# Patient Record
Sex: FEMALE | Race: WHITE | NOT HISPANIC OR LATINO | Employment: UNEMPLOYED | URBAN - METROPOLITAN AREA
[De-identification: names, ages, dates, MRNs, and addresses within clinical notes are randomized per-mention and may not be internally consistent; named-entity substitution may affect disease eponyms.]

---

## 2022-11-18 ENCOUNTER — OFFICE VISIT (OUTPATIENT)
Dept: FAMILY MEDICINE CLINIC | Facility: CLINIC | Age: 57
End: 2022-11-18

## 2022-11-18 ENCOUNTER — HOSPITAL ENCOUNTER (OUTPATIENT)
Dept: GENERAL RADIOLOGY | Facility: HOSPITAL | Age: 57
Discharge: HOME | End: 2022-11-18
Admitting: STUDENT IN AN ORGANIZED HEALTH CARE EDUCATION/TRAINING PROGRAM

## 2022-11-18 ENCOUNTER — PATIENT ROUNDING (BHMG ONLY) (OUTPATIENT)
Dept: FAMILY MEDICINE CLINIC | Facility: CLINIC | Age: 57
End: 2022-11-18

## 2022-11-18 VITALS
OXYGEN SATURATION: 99 % | HEIGHT: 63 IN | WEIGHT: 118.8 LBS | BODY MASS INDEX: 21.05 KG/M2 | SYSTOLIC BLOOD PRESSURE: 110 MMHG | DIASTOLIC BLOOD PRESSURE: 68 MMHG | HEART RATE: 75 BPM

## 2022-11-18 DIAGNOSIS — R22.31 NODULE OF FINGER OF RIGHT HAND: ICD-10-CM

## 2022-11-18 DIAGNOSIS — E03.9 ACQUIRED HYPOTHYROIDISM: Primary | ICD-10-CM

## 2022-11-18 DIAGNOSIS — Z00.00 ENCOUNTER FOR MEDICAL EXAMINATION TO ESTABLISH CARE: ICD-10-CM

## 2022-11-18 DIAGNOSIS — R79.0 ABNORMAL IRON SATURATION: ICD-10-CM

## 2022-11-18 DIAGNOSIS — M25.649 STIFFNESS OF HAND JOINT, UNSPECIFIED LATERALITY: ICD-10-CM

## 2022-11-18 DIAGNOSIS — Z12.83 SCREENING EXAM FOR SKIN CANCER: ICD-10-CM

## 2022-11-18 DIAGNOSIS — Z85.820 HISTORY OF MELANOMA: ICD-10-CM

## 2022-11-18 PROCEDURE — 99386 PREV VISIT NEW AGE 40-64: CPT | Performed by: STUDENT IN AN ORGANIZED HEALTH CARE EDUCATION/TRAINING PROGRAM

## 2022-11-18 PROCEDURE — 99204 OFFICE O/P NEW MOD 45 MIN: CPT | Performed by: STUDENT IN AN ORGANIZED HEALTH CARE EDUCATION/TRAINING PROGRAM

## 2022-11-18 PROCEDURE — 73120 X-RAY EXAM OF HAND: CPT

## 2022-11-18 RX ORDER — TRETINOIN 1 MG/G
CREAM TOPICAL
COMMUNITY
Start: 2008-01-01

## 2022-11-18 RX ORDER — LEVOTHYROXINE SODIUM 0.05 MG/1
50 TABLET ORAL DAILY
Qty: 90 TABLET | Refills: 1 | Status: SHIPPED | OUTPATIENT
Start: 2022-11-18

## 2022-11-18 RX ORDER — LEVOTHYROXINE SODIUM 0.05 MG/1
50 TABLET ORAL DAILY
COMMUNITY
Start: 2003-01-01 | End: 2022-11-18 | Stop reason: SDUPTHER

## 2022-11-18 RX ORDER — ADAPALENE 3 MG/G
GEL TOPICAL
COMMUNITY
Start: 2012-01-01

## 2022-11-18 RX ORDER — AZELAIC ACID 0.15 G/G
GEL TOPICAL
COMMUNITY
Start: 2012-01-01

## 2022-11-18 NOTE — PROGRESS NOTES
New Patient Office Visit      Patient Name: Maru Arenas  : 1965   MRN: [id number]   Care Team: Patient Care Team:  Ana Henning DO as PCP - General (Internal Medicine)    Chief Complaint:    Chief Complaint   Patient presents with   • new patient preventative medicine service       History of Present Illness: Maru Arenas is a 57 y.o. female fe who is here today to establish care.  Previously living in [place] and moved to [place]. Previously following with Dr. Tuttle-Shelley Elizondo - Primary Care. She is feeling well today. No acute complaints. She is  with children. Her  presents with her today.    Hypothyroid, thyroid nodule - has had US and biopsy in the past few years. Denies growth of thyroid nodule, discomfort, or dysphagia. Has been taking levothyroxine 50mcg daily.     Possible hemochromatosis - reports history of elevated iron saturation and concerns for hemachromatosis. Denies abdominal discomfort, rash, skin discoloration, nausea, vomiting. Her cousin's daughter has hemachromatosis.     Right fourth PIP flexed, has noticed various lumps, acheiness, tightness of bilateral hands over the past year. Feels she has difficulty with  at times.     History of stage 0 melanoma several years ago s/p excision. Follows annually with derm and needs new referral today     Rosacea - using adapalene, azelaic acis, and tretinoin topical treatment. Well controlled with this.    Subjective      Review of Systems:   Review of Systems - See HPI    Past Medical History:   Past Medical History:   Diagnosis Date   • Arthritis     unsure about this   • Cancer (HCC) 2012    basal cell, melanoma stage 0   • History of medical problems 2017    possible hemachromatosis   • Hypothyroidism     on levothyroxine 50 micrograms       Past Surgical History: History reviewed. No pertinent surgical history.    Family History:   Family History   Problem Relation Age of Onset   •  "Vision loss Mother         glaucoma   • Other Mother         dementia   • Hearing loss Father    • Other Father         multiple system atrophy   • Heart disease Maternal Grandfather    • Cancer Maternal Grandmother         colon cancer   • Mental illness Maternal Grandmother         OCD   • Stroke Paternal Grandfather        Social History:   Social History     Socioeconomic History   • Marital status:    Tobacco Use   • Smoking status: Never   • Smokeless tobacco: Never   Vaping Use   • Vaping Use: Never used   Substance and Sexual Activity   • Alcohol use: Not Currently     Alcohol/week: 1.0 standard drink     Types: 1 Glasses of wine per week   • Drug use: Never   • Sexual activity: Yes     Partners: Male     Birth control/protection: Vasectomy       Tobacco History:   Social History     Tobacco Use   Smoking Status Never   Smokeless Tobacco Never       Medications:     Current Outpatient Medications:   •  adapalene (DIFFERIN) 0.3 % gel, , Disp: , Rfl:   •  azelaic acid (AZELEX) 15 % gel, , Disp: , Rfl:   •  levothyroxine (SYNTHROID, LEVOTHROID) 50 MCG tablet, Take 1 tablet by mouth Daily., Disp: 90 tablet, Rfl: 1  •  tretinoin (RETIN-A) 0.1 % cream, , Disp: , Rfl:     Allergies:   Allergies   Allergen Reactions   • Codeine Nausea Only       Objective     Physical Exam:  Vital Signs:   Vitals:    11/18/22 0806   BP: 110/68   Pulse: 75   SpO2: 99%   Weight: 53.9 kg (118 lb 12.8 oz)   Height: 160 cm (63\")     Body mass index is 21.04 kg/m².     Physical Exam  Vitals reviewed.   Constitutional:       Appearance: Normal appearance.   Cardiovascular:      Rate and Rhythm: Normal rate and regular rhythm.      Pulses: Normal pulses.      Heart sounds: Normal heart sounds.   Pulmonary:      Effort: Pulmonary effort is normal. No respiratory distress.      Breath sounds: Normal breath sounds. No wheezing.   Musculoskeletal:      Comments: Fixed flexion of right 4th digit PIP. Nontender. She does have few scattered " nodules on bilateral hands. Other than right 4th digit which has difficulty with extension, ROM is intact. Strength is intact in bilateral hands.   Skin:     General: Skin is warm and dry.   Neurological:      Mental Status: She is alert.   Psychiatric:         Mood and Affect: Mood normal.         Behavior: Behavior normal.         Judgment: Judgment normal.         Assessment / Plan      Assessment/Plan:   Problems Addressed This Visit  Diagnoses and all orders for this visit:    1. Acquired hypothyroidism (Primary)  -     levothyroxine (SYNTHROID, LEVOTHROID) 50 MCG tablet; Take 1 tablet by mouth Daily.  Dispense: 90 tablet; Refill: 1  Reports normal TSH 6 months ago. Refilled synthroid today.   Will review records for thyroid nodule once received     2. History of melanoma  3. Screening exam for skin cancer  -     Ambulatory Referral to Dermatology    4. Nodule of finger of right hand   5. Stiffness of hand joint, unspecified laterality  -     XR Hand 2 View Bilateral; Future  Will obtain XR today. If unrevealing, she may benefit from rheumatologic screening labs, she is unsure if she has had this in the past. Will review records from previous PCP and plan for labs at next visit    6. Encounter for medical examination to establish care  Discussed importance of preventative care including vaccinations, age appropriate cancer screening, routine lab work, healthy diet, and active lifestyle.  She has done FOBT testing yearly for colon cancer screening. Believes it has been >1 year since last screen. interested in cologuard once she has a permanent address.   Mammogram about 1 year ago - will obtain records  Pap smear - has had this within the past few year, will need to check records.     Requested records from previous provider within [facility]. Will independently review progress notes, labs, imaging, immunization history, cancer screenings.     7. Abnormal iron saturation  She reports labs about  6 months ago and would like to avoid unnecessary lab draws. I have requested records from patient's previous PCP and will independently review to determine next steps in workup        Plan of care reviewed with patient at the conclusion of today's visit. Education was provided regarding diagnosis and management.  Patient verbalizes understanding of and agreement with management plan.      Follow Up:   Return in about 3 months (around 2/18/2023) for Recheck chronic medical conditions . or sooner if needed          DO WILBERT Zamora RD  [facility]  [address]  [facility] [address]  Fax [phone]  Phone [phone]

## 2023-03-09 ENCOUNTER — TRANSCRIBE ORDERS (OUTPATIENT)
Dept: LAB | Facility: HOSPITAL | Age: 58
End: 2023-03-09
Payer: COMMERCIAL

## 2023-03-09 ENCOUNTER — LAB (OUTPATIENT)
Dept: LAB | Facility: HOSPITAL | Age: 58
End: 2023-03-09
Payer: COMMERCIAL

## 2023-03-09 DIAGNOSIS — Z01.419 ROUTINE GYNECOLOGICAL EXAMINATION: ICD-10-CM

## 2023-03-09 DIAGNOSIS — N89.8 OLD VAGINAL LACERATION: ICD-10-CM

## 2023-03-09 DIAGNOSIS — R23.2 FLUSHING: Primary | ICD-10-CM

## 2023-03-09 DIAGNOSIS — R23.2 FLUSHING: ICD-10-CM

## 2023-03-09 LAB
25(OH)D3 SERPL-MCNC: 111 NG/ML (ref 30–100)
CHOLEST SERPL-MCNC: 263 MG/DL (ref 0–200)
DEPRECATED RDW RBC AUTO: 40.6 FL (ref 37–54)
ERYTHROCYTE [DISTWIDTH] IN BLOOD BY AUTOMATED COUNT: 11.4 % (ref 12.3–15.4)
ESTRADIOL SERPL HS-MCNC: 53.8 PG/ML
FERRITIN SERPL-MCNC: 49.3 NG/ML (ref 13–150)
FSH SERPL-ACNC: 64 MIU/ML
HBA1C MFR BLD: 5.3 % (ref 4.8–5.6)
HCT VFR BLD AUTO: 38.7 % (ref 34–46.6)
HDLC SERPL QL: 2.55
HDLC SERPL-MCNC: 103 MG/DL (ref 40–60)
HGB BLD-MCNC: 13.2 G/DL (ref 12–15.9)
IRON 24H UR-MRATE: 109 MCG/DL (ref 37–145)
IRON SATN MFR SERPL: 43 % (ref 20–50)
LDLC SERPL CALC-MCNC: 150 MG/DL (ref 0–100)
MCH RBC QN AUTO: 33.2 PG (ref 26.6–33)
MCHC RBC AUTO-ENTMCNC: 34.1 G/DL (ref 31.5–35.7)
MCV RBC AUTO: 97.5 FL (ref 79–97)
PLATELET # BLD AUTO: 186 10*3/MM3 (ref 140–450)
PMV BLD AUTO: 11.8 FL (ref 6–12)
RBC # BLD AUTO: 3.97 10*6/MM3 (ref 3.77–5.28)
T3 SERPL-MCNC: 56.9 NG/DL (ref 80–200)
T3FREE SERPL-MCNC: 1.72 PG/ML (ref 2–4.4)
T4 FREE SERPL-MCNC: 1.47 NG/DL (ref 0.93–1.7)
TIBC SERPL-MCNC: 253 MCG/DL (ref 298–536)
TRANSFERRIN SERPL-MCNC: 170 MG/DL (ref 200–360)
TRIGL SERPL-MCNC: 65 MG/DL (ref 0–150)
TSH SERPL DL<=0.05 MIU/L-ACNC: 1.46 UIU/ML (ref 0.27–4.2)
VLDLC SERPL-MCNC: 10 MG/DL (ref 5–40)
WBC NRBC COR # BLD: 4.3 10*3/MM3 (ref 3.4–10.8)

## 2023-03-09 PROCEDURE — 84443 ASSAY THYROID STIM HORMONE: CPT

## 2023-03-09 PROCEDURE — 83001 ASSAY OF GONADOTROPIN (FSH): CPT

## 2023-03-09 PROCEDURE — 82306 VITAMIN D 25 HYDROXY: CPT

## 2023-03-09 PROCEDURE — 84466 ASSAY OF TRANSFERRIN: CPT

## 2023-03-09 PROCEDURE — 83540 ASSAY OF IRON: CPT

## 2023-03-09 PROCEDURE — 84482 T3 REVERSE: CPT

## 2023-03-09 PROCEDURE — 82728 ASSAY OF FERRITIN: CPT

## 2023-03-09 PROCEDURE — 82670 ASSAY OF TOTAL ESTRADIOL: CPT

## 2023-03-09 PROCEDURE — 83036 HEMOGLOBIN GLYCOSYLATED A1C: CPT

## 2023-03-09 PROCEDURE — 84481 FREE ASSAY (FT-3): CPT

## 2023-03-09 PROCEDURE — 80061 LIPID PANEL: CPT

## 2023-03-09 PROCEDURE — 84439 ASSAY OF FREE THYROXINE: CPT

## 2023-03-09 PROCEDURE — 36415 COLL VENOUS BLD VENIPUNCTURE: CPT

## 2023-03-09 PROCEDURE — 85027 COMPLETE CBC AUTOMATED: CPT

## 2023-03-16 LAB — T3REVERSE SERPL-MCNC: 20.1 NG/DL (ref 9.2–24.1)

## 2023-09-05 ENCOUNTER — OFFICE VISIT (OUTPATIENT)
Dept: FAMILY MEDICINE CLINIC | Facility: CLINIC | Age: 58
End: 2023-09-05
Payer: COMMERCIAL

## 2023-09-05 VITALS
BODY MASS INDEX: 21.26 KG/M2 | OXYGEN SATURATION: 98 % | WEIGHT: 120 LBS | SYSTOLIC BLOOD PRESSURE: 108 MMHG | HEIGHT: 63 IN | DIASTOLIC BLOOD PRESSURE: 72 MMHG | HEART RATE: 80 BPM

## 2023-09-05 DIAGNOSIS — B00.1 COLD SORE: Primary | ICD-10-CM

## 2023-09-05 PROCEDURE — 99213 OFFICE O/P EST LOW 20 MIN: CPT | Performed by: NURSE PRACTITIONER

## 2023-09-05 RX ORDER — VALACYCLOVIR HYDROCHLORIDE 1 G/1
1000 TABLET, FILM COATED ORAL 2 TIMES DAILY
Qty: 20 TABLET | Refills: 0 | Status: SHIPPED | OUTPATIENT
Start: 2023-09-05 | End: 2023-09-07 | Stop reason: SDUPTHER

## 2023-09-05 RX ORDER — ESTRADIOL 0.05 MG/D
PATCH, EXTENDED RELEASE TRANSDERMAL
COMMUNITY
Start: 2023-08-07

## 2023-09-05 RX ORDER — ESTRADIOL 0.5 MG/.5G
GEL TOPICAL
COMMUNITY

## 2023-09-05 NOTE — PROGRESS NOTES
"Chief Complaint  breakout (Rt chin x2-3 days has a crust on it.no pain or itchingused mupirocin onit)    Subjective        Maru Arenas presents to [facility]  History of Present Illness  The patient is a 57 year old female who presents to the clinic for a skin lesion that developed on her chin two days ago. It has yellow crusting and she was able to remove the scab easily initially and is concerned she may have impetigo. Denies fever, nausea, or other systemic symptoms. Denies history of cold sore, shingles, or impetigo. Denies numbness, itching, pain/burning on right side of the head. No other skin lesions on her body. She is applying Bactroban to the affected area.  Review of Systems   Constitutional:  Negative for chills, diaphoresis and fever.   HENT:  Negative for congestion, ear discharge, ear pain, facial swelling, hearing loss, mouth sores, nosebleeds, postnasal drip, rhinorrhea, sinus pressure, sinus pain, sneezing, sore throat, tinnitus, trouble swallowing and voice change.    Respiratory:  Negative for cough, shortness of breath and wheezing.    Gastrointestinal:  Negative for diarrhea, nausea and vomiting.   Musculoskeletal:  Negative for myalgias.   Skin:  Positive for rash.   Neurological:  Negative for dizziness, light-headedness and headaches.    Objective   Vital Signs:  /72   Pulse 80   Ht 160 cm (63\")   Wt 54.4 kg (120 lb)   SpO2 98%   BMI 21.26 kg/m²   Estimated body mass index is 21.26 kg/m² as calculated from the following:    Height as of this encounter: 160 cm (63\").    Weight as of this encounter: 54.4 kg (120 lb).       BMI is within normal parameters. No other follow-up for BMI required.      Physical Exam  Vitals and nursing note reviewed.   Constitutional:       Appearance: Normal appearance.   HENT:      Head: Normocephalic and atraumatic.      Right Ear: Tympanic membrane, ear canal and external ear normal.      Left Ear: Tympanic " membrane, ear canal and external ear normal.      Nose: Nose normal.      Mouth/Throat:      Mouth: Mucous membranes are moist.      Pharynx: Oropharynx is clear.   Eyes:      Conjunctiva/sclera: Conjunctivae normal.      Pupils: Pupils are equal, round, and reactive to light.   Cardiovascular:      Rate and Rhythm: Normal rate and regular rhythm.      Heart sounds: Normal heart sounds.   Pulmonary:      Effort: Pulmonary effort is normal.      Breath sounds: Normal breath sounds.   Musculoskeletal:         General: Normal range of motion.      Cervical back: Normal range of motion and neck supple. No rigidity or tenderness.   Lymphadenopathy:      Cervical: No cervical adenopathy.   Skin:     Comments: Yellowish crusted scab on the right outer chin, 3/4 cm round. Scab is intact on examination. No open areas, no bleeding.    Neurological:      General: No focal deficit present.      Mental Status: She is alert.   Psychiatric:         Mood and Affect: Mood normal.         Behavior: Behavior normal.         Thought Content: Thought content normal.         Judgment: Judgment normal.      Result Review :                   Assessment and Plan   Diagnoses and all orders for this visit:    1. Cold sore (Primary)  -     valACYclovir (Valtrex) 1000 MG tablet; Take 1 tablet by mouth 2 (Two) Times a Day.  Dispense: 20 tablet; Refill: 0      Reviewed exam findings with the patient. Discussed treatment options. Recommend antiviral, take as directed. The patient can continue to use Bactroban cream on lesion twice daily. Discussed lab draw to check for HSV 1. Patient declines lab draw today. She can further discuss this with her PCP at her next follow up.        Follow Up   Return if symptoms worsen or fail to improve.  Patient was given instructions and counseling regarding her condition or for health maintenance advice. Please see specific information pulled into the AVS if appropriate.

## 2023-09-07 ENCOUNTER — OFFICE VISIT (OUTPATIENT)
Dept: FAMILY MEDICINE CLINIC | Facility: CLINIC | Age: 58
End: 2023-09-07
Payer: COMMERCIAL

## 2023-09-07 VITALS
SYSTOLIC BLOOD PRESSURE: 112 MMHG | WEIGHT: 120 LBS | OXYGEN SATURATION: 100 % | BODY MASS INDEX: 21.26 KG/M2 | DIASTOLIC BLOOD PRESSURE: 76 MMHG | HEIGHT: 63 IN | HEART RATE: 70 BPM

## 2023-09-07 DIAGNOSIS — B00.1 COLD SORE: ICD-10-CM

## 2023-09-07 DIAGNOSIS — H57.89 REDNESS OF BOTH EYES: ICD-10-CM

## 2023-09-07 DIAGNOSIS — H57.89 EYE DRAINAGE: Primary | ICD-10-CM

## 2023-09-07 DIAGNOSIS — L98.9 SKIN LESION OF FACE: ICD-10-CM

## 2023-09-07 PROCEDURE — 99214 OFFICE O/P EST MOD 30 MIN: CPT | Performed by: NURSE PRACTITIONER

## 2023-09-07 PROCEDURE — 87252 VIRUS INOCULATION TISSUE: CPT | Performed by: NURSE PRACTITIONER

## 2023-09-07 RX ORDER — PROGESTERONE 100 MG/1
1 CAPSULE ORAL
COMMUNITY
Start: 2023-05-09

## 2023-09-07 RX ORDER — VALACYCLOVIR HYDROCHLORIDE 1 G/1
TABLET, FILM COATED ORAL
Qty: 30 TABLET | Refills: 0 | Status: SHIPPED | OUTPATIENT
Start: 2023-09-07

## 2023-09-07 RX ORDER — NEOMYCIN/POLYMYXIN B/HYDROCORT 3.5-10K-1
1 SUSPENSION, DROPS(FINAL DOSAGE FORM)(ML) OPHTHALMIC (EYE)
Qty: 7.5 ML | Refills: 0 | Status: SHIPPED | OUTPATIENT
Start: 2023-09-07

## 2023-09-07 NOTE — PROGRESS NOTES
"Subjective   Maru Arenas is a 57 y.o. female.   Chief Complaint   Patient presents with    Eye Problem     Woke up with crusted eyes, didn't know if it was from her chin issue didn't know if it was pink eye    Headache     Having a headche today       History of Present Illness   Patient of Dr. London,Seen Tuesday by milagros POON, prescribed valcyclovir for right chin lesion that she noticed on Sunday when she looked in the mirror, possible small blisters with a yellow coating. ; possibly some improvement. States her  does have hx of genital herpes, denies current breakout, patient also denies ever having a cold sore or similar lesion.  Last night took a walk, woke up this morning, eyes were crusted closed; slight headache today; nasal congestion last night that has improved.      The following portions of the patient's history were reviewed and updated as appropriate: allergies, current medications, past family history, past medical history, past social history, past surgical history, and problem list.    Review of Systems   Constitutional:  Negative for chills and fever.   Eyes:  Positive for discharge and redness. Negative for visual disturbance.   Skin:  Positive for wound (right chin lesion).   Neurological:  Positive for headaches.     Objective   Physical Exam  Vitals reviewed.   Constitutional:       General: She is not in acute distress.     Appearance: Normal appearance. She is not ill-appearing, toxic-appearing or diaphoretic.   HENT:      Head:     Cardiovascular:      Rate and Rhythm: Normal rate.   Pulmonary:      Effort: Pulmonary effort is normal. No respiratory distress.   Skin:     Findings: Erythema (under eyes and cheeks) present.   Neurological:      Mental Status: She is alert.     /76   Pulse 70   Ht 160 cm (63\")   Wt 54.4 kg (120 lb)   SpO2 100%   BMI 21.26 kg/m²     Assessment & Plan   Problems Addressed this Visit    None  Visit Diagnoses       Eye drainage    -  " Primary    Relevant Medications    neomycin-polymyxin-hydrocortisone (CORTISPORIN) 3.5-14875-8 ophthalmic suspension    Cold sore        Relevant Medications    valACYclovir (Valtrex) 1000 MG tablet    Other Relevant Orders    Virus Culture - Swab, Chin    Redness of both eyes        Relevant Medications    neomycin-polymyxin-hydrocortisone (CORTISPORIN) 3.5-18239-5 ophthalmic suspension    Skin lesion of face        Relevant Orders    Virus Culture - Swab, Chin          Diagnoses         Codes Comments    Eye drainage    -  Primary ICD-10-CM: H57.89  ICD-9-CM: 379.93     Cold sore     ICD-10-CM: B00.1  ICD-9-CM: 054.9     Redness of both eyes     ICD-10-CM: H57.89  ICD-9-CM: 379.93     Skin lesion of face     ICD-10-CM: L98.9  ICD-9-CM: 709.9             Cortisporin prescribed for eye drainage advised daily antihistamine since symptoms started when she was outside  Viral culture swab collected from right chin lesion, continue antiviral.  Ok to wash face, stop using Retin A and differin gel until skin redness, eye drainage and chin lesion have al resolved  Patient was encouraged to keep me informed of any acute changes, lack of improvement, or any new concerning symptoms.

## 2023-09-16 LAB — VIRUS SPEC CULT: ABNORMAL
